# Patient Record
Sex: MALE
[De-identification: names, ages, dates, MRNs, and addresses within clinical notes are randomized per-mention and may not be internally consistent; named-entity substitution may affect disease eponyms.]

---

## 2023-01-13 ENCOUNTER — HOSPITAL ENCOUNTER (OUTPATIENT)
Dept: HOSPITAL 97 - ER | Age: 58
Setting detail: OBSERVATION
LOS: 2 days | Discharge: HOME | End: 2023-01-15
Attending: HOSPITALIST | Admitting: HOSPITALIST
Payer: SELF-PAY

## 2023-01-13 VITALS — BODY MASS INDEX: 21.6 KG/M2

## 2023-01-13 DIAGNOSIS — R41.82: ICD-10-CM

## 2023-01-13 DIAGNOSIS — Z87.891: ICD-10-CM

## 2023-01-13 DIAGNOSIS — E78.5: ICD-10-CM

## 2023-01-13 DIAGNOSIS — E11.649: Primary | ICD-10-CM

## 2023-01-13 DIAGNOSIS — Z20.822: ICD-10-CM

## 2023-01-13 LAB
BUN BLD-MCNC: 12 MG/DL (ref 7–18)
GLUCOSE SERPLBLD-MCNC: 40 MG/DL (ref 74–106)
HCT VFR BLD CALC: 44 % (ref 39.6–49)
INR BLD: 1.03
LYMPHOCYTES # SPEC AUTO: 1.2 K/UL (ref 0.7–4.9)
MCV RBC: 87.3 FL (ref 80–100)
METHAMPHET UR QL SCN: NEGATIVE
PMV BLD: 7.9 FL (ref 7.6–11.3)
POTASSIUM SERPL-SCNC: 3.8 MMOL/L (ref 3.5–5.1)
RBC # BLD: 5.04 M/UL (ref 4.33–5.43)
THC SERPL-MCNC: NEGATIVE NG/ML
TROPONIN I SERPL HS-MCNC: 6.8 PG/ML (ref ?–58.9)

## 2023-01-13 PROCEDURE — 36415 COLL VENOUS BLD VENIPUNCTURE: CPT

## 2023-01-13 PROCEDURE — 87811 SARS-COV-2 COVID19 W/OPTIC: CPT

## 2023-01-13 PROCEDURE — 71045 X-RAY EXAM CHEST 1 VIEW: CPT

## 2023-01-13 PROCEDURE — 96365 THER/PROPH/DIAG IV INF INIT: CPT

## 2023-01-13 PROCEDURE — 70450 CT HEAD/BRAIN W/O DYE: CPT

## 2023-01-13 PROCEDURE — 85730 THROMBOPLASTIN TIME PARTIAL: CPT

## 2023-01-13 PROCEDURE — 85610 PROTHROMBIN TIME: CPT

## 2023-01-13 PROCEDURE — 83036 HEMOGLOBIN GLYCOSYLATED A1C: CPT

## 2023-01-13 PROCEDURE — 84100 ASSAY OF PHOSPHORUS: CPT

## 2023-01-13 PROCEDURE — 80048 BASIC METABOLIC PNL TOTAL CA: CPT

## 2023-01-13 PROCEDURE — 80053 COMPREHEN METABOLIC PANEL: CPT

## 2023-01-13 PROCEDURE — 82947 ASSAY GLUCOSE BLOOD QUANT: CPT

## 2023-01-13 PROCEDURE — 80307 DRUG TEST PRSMV CHEM ANLYZR: CPT

## 2023-01-13 PROCEDURE — 84484 ASSAY OF TROPONIN QUANT: CPT

## 2023-01-13 PROCEDURE — 83735 ASSAY OF MAGNESIUM: CPT

## 2023-01-13 PROCEDURE — 99285 EMERGENCY DEPT VISIT HI MDM: CPT

## 2023-01-13 PROCEDURE — 96366 THER/PROPH/DIAG IV INF ADDON: CPT

## 2023-01-13 PROCEDURE — 80061 LIPID PANEL: CPT

## 2023-01-13 PROCEDURE — 93005 ELECTROCARDIOGRAM TRACING: CPT

## 2023-01-13 PROCEDURE — 85025 COMPLETE CBC W/AUTO DIFF WBC: CPT

## 2023-01-13 PROCEDURE — 81003 URINALYSIS AUTO W/O SCOPE: CPT

## 2023-01-13 NOTE — RAD REPORT
EXAM DESCRIPTION:  Bill Single View1/13/2023 8:10 pm

 

CLINICAL HISTORY:  Slurred speech

 

COMPARISON:  none

 

FINDINGS:   The lungs appear clear of acute infiltrate. The heart is normal size

 

IMPRESSION:   No acute abnormalities displayed

## 2023-01-13 NOTE — P.HP
Certification for Inpatient


Patient admitted to: Observation


With expected LOS: <2 Midnights


Patient will require the following post-hospital care: None


Practitioner: I am a practitioner with admitting privileges, knowledge of 

patient current condition, hospital course, and medical plan of care.


Services: Services provided to patient in accordance with Admission requirements

found in Title 42 Section 412.3 of the Code of Federal Regulations





Patient History


Date of Service: 01/13/23


Primary Care Provider: Adriel hoover


Reason for admission: Hypoglycemia


History of Present Illness: 


Patient is a 57-year-old male with past medical history of noninsulin-dependent 

type 2 diabetes and hyperlipidemia who presented to the emergency department 

with altered mental status.  Code stroke was initially called but he was found 

to have a blood sugar of 20.  Patient's family reports that he used to take 

over-the-counter insulin but was recently switched over to metformin and 

glyburide by the [facility].  However, family is unsure if he has been 

taking his medications or not.  They say that patient has been acting abnormally

for a week or so and that he barely eats. He does not check his BS at home.  

Head CT was negative for CVA. UDS/etoh negative.  His mentation improved with 

D10W.  He is now awake alert answering questions appropriately.  He is primarily

Setswana-speaking but can answer most questions. Blood sugar has come up to 157. 

ED provider wishes to admit patient for observation.





Home medications list reviewed: Yes





- Past Medical/Surgical History


Diabetic: Yes


-: Type 2 Diabetes, Non-Insulin Dependent


-: Hyperlipidemia


Past Surgical History: Patient denies surgical history


Psychosocial/ Personal History: Patient lives at home alone.





- Family History


  ** Brother


-: Diabetes





- Social History


Smoking Status: Former smoker


Alcohol use: No


CD- Drugs: No


Caffeine use: Yes


Place of Residence: Home





Review of Systems


Unremarkable





Physical Examination





- Vital Signs


Temperature: 98.1 F


Blood Pressure: 122/70


Pulse: 84


Respirations: 20


Pulse Ox (%): 100





- Physical Exam


General: Alert, In no apparent distress, Oriented x3


HEENT: Atraumatic, PERRLA, EOMI, Sclerae nonicteric


Neck: Supple, 2+ carotid pulse no bruit


Respiratory: Clear to auscultation bilaterally, Normal air movement


Cardiovascular: Regular rate/rhythm, Normal S1 S2


Gastrointestinal: Normal bowel sounds, No tenderness


Musculoskeletal: No tenderness


Integumentary: No rashes


Neurological: Normal speech, Normal strength at 5/5 x4 extr, Normal tone, Normal

affect





- Studies


Laboratory Data (last 24 hrs)





01/13/23 20:55: PT 11.3, INR 1.03, APTT 30.1


01/13/23 20:12: WBC 15.00 H, Hgb 14.8, Hct 44.0, Plt Count 225


01/13/23 20:12: Sodium 141, Potassium 3.8, BUN 12, Creatinine 1.02, Glucose 40 

L*








Assessment and Plan





- Problems (Diagnosis)


(1) Type 2 diabetes mellitus


Current Visit: Yes   Status: Chronic   


Qualifiers: 


   Diabetes mellitus long term insulin use: without long term use   Diabetes 

mellitus complication status: with hypoglycemia   Diabetes mellitus complication

detail: without coma   Qualified Code(s): E11.649 - Type 2 diabetes mellitus 

with hypoglycemia without coma   





(2) Hyperlipidemia


Current Visit: Yes   Status: Chronic   


Qualifiers: 


   Hyperlipidemia type: unspecified   Qualified Code(s): E78.5 - Hyperlipidemia,

unspecified   





- Plan


Patient is admitted for observation for hypoglycemia.


Hypoglycemia secondary to sulfonylurea and not eating.


Monitor BS every 4 hours.


D5 1/2 at 75 cc/hr.


Check A1c and lipid panel in morning.


Diabetic education consult.


Patient's family requesting prescription for glucometer on dispo. 


Hold diabetic medications for now.


Monitor and replete electrolytes per protocol.


Full code.





Discharge Plan: Home


Plan to discharge in: 24 Hours





- Advance Directives


Does patient have a Living Will: No


Does patient have a Durable POA for Healthcare: No





- Code Status/Comfort Care


Code Status Assessed: Yes


Code Status: Full Code


Physician Review: Patient Assessed, Agree with Above Assessment and Plan


Critical Care: No


Time Spent Managing Pts Care (In Minutes): 50

## 2023-01-13 NOTE — ER
Nurse's Notes                                                                                     

 [facility]                                                                 

Name: Cole Mack                                                                               

Age: 57 yrs                                                                                       

Sex: Male                                                                                         

: 1965                                                                                   

MRN: [id number]                                                                                   

Arrival Date: 2023                                                                          

Time: 19:42                                                                                       

Account#: [id number]                                                                            

Bed 2                                                                                             

Private MD:                                                                                       

Diagnosis: Hypoglycemia, unspecified;Altered mental status, unspecified                           

                                                                                                  

Presentation:                                                                                     

                                                                                             

20:01 Chief complaint: Patient's son or daughter states: pt called her reporting that         kb3 

      something happened to his house and it's all torn up but he has no idea what happened.      

      States she arrived at his house approximately 20 minutes PTA and pt was altered and         

      slurring his speech. Pt follows commands, speech is slurred. Oriented to person. Pt's       

      daughter also reports that pt told her he had a similar episode of confusion ad slurred     

      speech around 12:00 this afternoon. Unknown how long it lasted. Daughter states last        

      seen normal was 1700 and she was with the patient. when asked about conflicting stories     

      and if pt uses drugs or alcohol, daughter became very defensive, stating no history at      

      all. Code joshua called and pt transferred immediately to CT. While in CT, pt is            

      combative and attempted to kick and hit the CT Tech. Code Santoyo called. Ebola Screen:        

      Patient negative for fever greater than or equal to 101.5 degrees Fahrenheit, and           

      additional compatible Ebola Virus Disease symptoms Patient denies exposure to               

      infectious person. Patient denies travel to an Ebola-affected area in the 21 days           

      before illness onset. An acute neurological deficit is present. The charge nurse has        

      been notified. Initial Sepsis Screen: Does the patient meet any 2 criteria?. Risk           

      Assessment: Do you want to hurt yourself or someone else? Patient reports no desire to      

      harm self or others. Onset of symptoms is unknown.                                          

20:01 Method Of Arrival: Wheelchair                                                           kb3 

20:01 Acuity: MARIO 2                                                                           kb3 

20:15 Coronavirus screen: At this time, the client does not indicate any symptoms associated  jb4 

      with coronavirus-19. The patients blood glucose was checked before arriving to the          

      hospital and was found to be hypoglycemic.                                                  

                                                                                                  

Triage Assessment:                                                                                

20:09 The onset of the patients symptoms was at an unknown time. General: Appears in no       kb3 

      apparent distress. slender, Behavior is agitated, combative. Pain: Denies pain. Neuro:      

      Level of Consciousness is alert, confused, Oriented to person,  are equal              

      bilaterally Moves all extremities. Speech is slurred, Facial symmetry appears normal,       

      Reports.                                                                                    

                                                                                                  

Stroke Activation: Symptom onset > 6 hours                                                        

 Physician: Stroke Attending; Name: ; Notified At: ; Arrived At:                                  

 Physician: Chief Stroke Resident; Name: ; Notified At: ; Arrived At:                             

 Physician: Stroke Resident; Name: ; Notified At: ; Arrived At:                                   

 Physician: ED Attending; Name: Cipriano; Notified At:  19:55; Arrived At:               

   19:55                                                                                

 Physician: ED Resident; Name: ; Notified At: ; Arrived At:                                       

                                                                                                  

Historical:                                                                                       

- Allergies:                                                                                      

20:09 No Known Allergies;                                                                     kb3 

- Home Meds:                                                                                      

22:27 Metformin Oral [Active]; Glyburide Oral [Active];                                       kl  

- PMHx:                                                                                           

22:27 NIDDM;                                                                                  kl  

- PSHx:                                                                                           

20:09 None;                                                                                   kb3 

                                                                                                  

- Immunization history:: Adult Immunizations unknown, Last tetanus immunization:                  

  unknown.                                                                                        

- Social history:: Smoking status: Patient denies any tobacco usage or history of.                

  Patient/guardian denies using alcohol, street drugs.                                            

                                                                                                  

                                                                                                  

Screenin:30 [facility] ED Fall Risk Assessment (Adult) History of falling in the last 3 months,       jb4 

      including since admission No falls in past 3 months (0 pts) Confusion or Disorientation     

      No (0 pts) Intoxicated or Sedated No (0 pts) Impaired Gait No (0 pts) Mobility Assist       

      Device Used No (0 pt) Altered Elimination No (0 pt) Score/Fall Risk Level 0 - 2 = Low       

      Risk Oriented to surroundings, Maintained a safe environment. Abuse screen: Denies          

      threats or abuse. Nutritional screening: No deficits noted. Tuberculosis screening: No      

      symptoms or risk factors identified.                                                        

                                                                                                  

Assessment:                                                                                       

20:28 VAN Scoring: Arm Drift: Patients demonstrates NO arm weakness. Patient is VAN Negative. jb4 

      The patient has not been NPO before screening. The patient is alert, and able to follow     

      commands. The patient does not exhibit slurred or garbled speech. The patient is not        

      exhibiting difficulty speaking. The patient does not exhibit difficulty understanding       

      words. The patient is able to swallow own secretions with no drooling or need for           

      suction. Patient tolerated one teaspoon of water. No drooling, immediate coughing,          

      gurgling, or clearing of the throat was noted. The patient tolerated 90mL of water. No      

      drooling, immediate coughing, gurgling, or clearing of the throat was noted. The            

      patient passed the bedside swallow screening. Oral medications may be given as ordered.     

      Contact Physician for further diet orders. Provider notified of bedside swallow             

      screening results: Steve Cota MD.                                                        

20:28 General: Appears in no apparent distress. comfortable, Behavior is calm, cooperative,   jb4 

      appropriate for age. Pain: Denies pain. Neuro: Level of Consciousness is awake, alert,      

      obeys commands, Oriented to person, place, time, situation. Cardiovascular: Patient's       

      skin is warm and dry. Respiratory: Airway is patent Respiratory effort is even,             

      unlabored, Respiratory pattern is regular, symmetrical. GI: No signs and/or symptoms        

      were reported involving the gastrointestinal system. : No signs and/or symptoms were      

      reported regarding the genitourinary system. EENT: No signs and/or symptoms were            

      reported regarding the EENT system. Derm: Skin is intact, Skin is pink, warm \T\ dry.       

      Musculoskeletal: Circulation, motion, and sensation intact. Range of motion: intact in      

      all extremities.                                                                            

21:20 Reassessment: Patient appears in no apparent distress at this time. Patient and/or      jb4 

      family updated on plan of care and expected duration. Pain level reassessed. Patient is     

      alert, oriented x 3, equal unlabored respirations, skin warm/dry/pink.  provider     

      notified.                                                                                   

22:30 Reassessment: Patient is alert, oriented x 3, equal unlabored respirations, skin        bb  

      warm/dry/pink. awaiting room assignment.                                                    

23:37 Reassessment: report called to William COVINGTON for room 412.                                  jb4 

                                                                                             

00:04 Reassessment: Patient appears in no apparent distress at this time. Patient and/or      jb4 

      family updated on plan of care and expected duration. Pain level reassessed. Patient is     

      alert, oriented x 3, equal unlabored respirations, skin warm/dry/pink.                      

                                                                                                  

Vital Signs:                                                                                      

                                                                                             

20:36  / 70; Pulse 80; Resp 18; Temp 98.1(TE); Pulse Ox 97% on R/A;                     jb4 

21:30  / 70; Pulse 84; Resp 20; Pulse Ox 100% on R/A;                                   jb4 

22:30 BP 99 / 75; Pulse 78; Resp 17 S; Temp 98.2; Pulse Ox 100% on R/A;                       bb  

23:15 BP 99 / 70; Pulse 71; Resp 16; Pulse Ox 100% on R/A;                                    jb4 

                                                                                                  

NIH Stroke Scale Scores:                                                                          

20:24 NIHSS Score: 0                                                                          kdr 

20:28 NIHSS Score: 0                                                                          jb4 

                                                                                                  

ED Course:                                                                                        

19:42 Patient arrived in ED.                                                                  jj6 

19:48 Steve Cota MD is Attending Physician.                                              kdr 

20:07 Triage completed.                                                                       kb3 

20:09 Arm band placed on right wrist.                                                         kb3 

20:10 CT Stroke Brain w/o Contrast In Process Unspecified.                                    EDMS

20:13 Stroke CXR 1 View In Process Unspecified.                                               EDMS

20:21 Nirmal Mistry, RN is Primary Nurse.                                                     jb4 

20:40 Initial lab(s) drawn, by ED staff, sent to lab. Inserted saline lock: 20 gauge in right jb4 

      antecubital area, using aseptic technique. Blood collected.                                 

20:40 Inserted saline lock: 22 gauge in left antecubital area, using aseptic technique.       jb4 

22:06 Mo Vergara MD is Hospitalizing Provider.                                           kdr 

                                                                                             

00:06 No provider procedures requiring assistance completed. Patient admitted, IV remains in  jb4 

      place.                                                                                      

                                                                                                  

Administered Medications:                                                                         

                                                                                             

20:10 Drug: D10 in Water [4ml/kg] 250 ml Route: IVP; Site: left antecubital;                  jb4 

23:42 Follow up: Response: Blood sugar is elevated                                            bb  

20:45 Drug: Banana Bag - (NS 0.9% 1000 ml, foLIC Acid 1 mg, Thiamine 100 mg, Multivitamin 1   jb4 

      amp) Route: IV; Rate: calculated rate; Site: left antecubital;                              

23:42 Follow up: IV Status: Completed infusion; IV Intake: 950ml                              bb  

20:45 Drug: Aspirin 325 mg Route: PO;                                                         jb4 

23:42 Follow up: Response: No adverse reaction                                                bb  

                                                                                                  

                                                                                                  

Intake:                                                                                           

23:42 IV: 950ml; Total: 950ml.                                                                bb  

                                                                                                  

Outcome:                                                                                          

22:07 Decision to Hospitalize by Provider.                                                    kdr 

23:41 Admitted to Tele accompanied by nurse, via stretcher, room 412, with chart.             bb  

23:41 Condition: stable                                                                           

23:41 Instructed on the need for admit.                                                           

                                                                                             

00:07 Patient left the ED.                                                                    jb4 

                                                                                                  

                                                                                                  

NIH Stroke Scale - NIH Stroke Score                                                               

Date: 2023                                                                                  

Time: 20:24                                                                                       

Total Score = 0                                                                                   

  1a. Level of Consciousness (LOC) - 0(Alert)                                                     

  1b. Level of Consciousness (LOC) (Month \T\ Age) - 0(Both)                                      

  1c. LOC Commands (Open \T\ Closes Eyes/) - 0(Both)                                          

   2. Best Gaze (Lateral Gaze Paresis) - 0(Normal)                                                

   3. Visual Field Loss - 0(No visual loss)                                                       

   4. Facial Palsy - 0(Normal)                                                                    

  5a. Left Arm: Motor (10-second hold) - 0(No drift)                                              

  5b. Right Arm: Motor (10-second hold) - 0(No drift)                                             

  6a. Left Leg: Motor (5-second hold - always test supine) - 0(No drift)                          

  6b. Right Leg: Motor (5-second hold - always test supine) - 0(No drift)                         

   7. Limb Ataxia (finger/nose \T\ heel/shin - test with eyes open) - 0(Absent)                   

   8. Sensory Loss (pinprick arms/legs/face) - 0(Normal)                                          

   9. Best Language: Aphasia (description/naming/reading) - 0(No aphasia)                         

  10. Dysarthria (speech clarity - read or repeat words) - 0(Normal)                              

  11. Extinction and Inattention (visual/tactile/auditory/spatial/personal) - 0(No                

      abnormality)                                                                                

Initials: [facility]                                                                                     

                                                                                                  

                                                                                                  

NIH Stroke Scale - NIH Stroke Score                                                               

Date: 2023                                                                                  

Time: 20:28                                                                                       

Total Score = 0                                                                                   

  1a. Level of Consciousness (LOC) - 0(Alert)                                                     

  1b. Level of Consciousness (LOC) (Month \T\ Age) - 0(Both)                                      

  1c. LOC Commands (Open \T\ Closes Eyes/) - 0(Both)                                          

   2. Best Gaze (Lateral Gaze Paresis) - 0(Normal)                                                

   3. Visual Field Loss - 0(No visual loss)                                                       

   4. Facial Palsy - 0(Normal)                                                                    

  5a. Left Arm: Motor (10-second hold) - 0(No drift)                                              

  5b. Right Arm: Motor (10-second hold) - 0(No drift)                                             

  6a. Left Leg: Motor (5-second hold - always test supine) - 0(No drift)                          

  6b. Right Leg: Motor (5-second hold - always test supine) - 0(No drift)                         

   7. Limb Ataxia (finger/nose \T\ heel/shin - test with eyes open) - 0(Absent)                   

   8. Sensory Loss (pinprick arms/legs/face) - 0(Normal)                                          

   9. Best Language: Aphasia (description/naming/reading) - 0(No aphasia)                         

  10. Dysarthria (speech clarity - read or repeat words) - 0(Normal)                              

  11. Extinction and Inattention (visual/tactile/auditory/spatial/personal) - 0(No                

      abnormality)                                                                                

Initials: jb4                                                                                     

                                                                                                  

Signatures:                                                                                       

Dispatcher [employer]                           EDDiana Millard, RN                     RN   Steve Gallego MD MD kdr Ballard, Brenda, RN                     RN   Nirmal Davis RN RN   jbBarbie Rincon Kelly RN                    RN   kb3                                                  

                                                                                                  

Corrections: (The following items were deleted from the chart)                                    

                                                                                             

21:06 20:36  / 70; Pulse 80bpm; Resp 18bpm; Pulse Ox 97% RA; stefanie                jbJovanny         

22:27 20:09 Home Meds: None; sandy noriega          

22: 20:09 PMHx: None; sandy noriega          

                                                                                                  

**************************************************************************************************

## 2023-01-13 NOTE — EDPHYS
Physician Documentation                                                                           

 [facility]                                                                 

Name: Cole Mack                                                                               

Age: 57 yrs                                                                                       

Sex: Male                                                                                         

: 1965                                                                                   

MRN: [id number]                                                                                   

Arrival Date: 2023                                                                          

Time: 19:42                                                                                       

Account#: [id number]                                                                            

Bed 2                                                                                             

Private MD:                                                                                       

ED Physician Steve Cota                                                                       

HPI:                                                                                              

                                                                                             

20:24 This 57 yrs old  Male presents to ER via Wheelchair with complaints of Slurred  kdr 

      Speech.                                                                                     

20:24 The patient presents to the emergency department with a speech or higher order brain    kdr 

      function problem, aphasia, that is mild. Onset: The symptoms/episode began/occurred         

      suddenly, just prior to arrival. Context: occurred at home, occurred while the patient      

      was at rest. Associated signs and symptoms: Pertinent positives: confusion. Severity of     

      symptoms: At their worst the symptoms were mild moderate just prior to arrival, in the      

      emergency department the symptoms have resolved By the time he had returned from CT.        

      Patient's baseline: Neuro: alert and fully oriented, Motor: no deficits, Ambulation:        

      walks without assistance, Speech: normal for age, The patient's primary language is         

      German and while he was not able to respond quickly to questions in English, he did        

      respond appropriately to questions rephrased in German. Current symptoms: confusion,       

      mildly confused and was somewhat combative in CT. BG found to be 22. The patient has        

      not experienced similar symptoms in the past. The patient has been recently seen by a       

      physician: the patient's primary care provider, 1 week(s) ago, Daughter related that he     

      is poorly compliant with his medications and does not eat properly..                        

                                                                                                  

Historical:                                                                                       

- Allergies:                                                                                      

20:09 No Known Allergies;                                                                     kb3 

- Home Meds:                                                                                      

22:27 Metformin Oral [Active]; Glyburide Oral [Active];                                       kl  

- PMHx:                                                                                           

22:27 NIDDM;                                                                                  kl  

- PSHx:                                                                                           

20:09 None;                                                                                   kb3 

                                                                                                  

- Immunization history:: Adult Immunizations unknown, Last tetanus immunization:                  

  unknown.                                                                                        

- Social history:: Smoking status: Patient denies any tobacco usage or history of.                

  Patient/guardian denies using alcohol, street drugs.                                            

                                                                                                  

                                                                                                  

ROS:                                                                                              

20:24 Constitutional: Negative for fever, chills, and weight loss, Eyes: Negative for injury, kdr 

      pain, redness, and discharge, ENT: Negative for injury, pain, and discharge, Neck:          

      Negative for injury, pain, and swelling, Cardiovascular: Negative for chest pain,           

      palpitations, and edema, Respiratory: Negative for shortness of breath, cough,              

      wheezing, and pleuritic chest pain, Abdomen/GI: Negative for abdominal pain, nausea,        

      vomiting, diarrhea, and constipation, Back: Negative for injury and pain, : Negative      

      for injury, bleeding, discharge, and swelling, MS/Extremity: Negative for injury and        

      deformity, Skin: Negative for injury, rash, and discoloration, Psych: Negative for          

      depression, anxiety, suicide ideation, homicidal ideation, and hallucinations,              

      Allergy/Immunology: Negative for hives, rash, and allergies, Endocrine: Negative for        

      neck swelling, polydipsia, polyuria, polyphagia, and marked weight changes,                 

      Hematologic/Lymphatic: Negative for swollen nodes, abnormal bleeding, and unusual           

      bruising.                                                                                   

20:24 Neuro: Positive for altered mental status, speech changes.                                  

                                                                                                  

Exam:                                                                                             

20:24 Constitutional:  This is a well developed, well nourished patient who is awake, alert,  kdr 

      and in no acute distress. Head/Face:  Normocephalic, atraumatic. Eyes:  Pupils equal        

      round and reactive to light, extra-ocular motions intact.  Lids and lashes normal.          

      Conjunctiva and sclera are non-icteric and not injected.  Cornea within normal limits.      

      Periorbital areas with no swelling, redness, or edema. Neck:  Trachea midline, no           

      thyromegaly or masses palpated, and no cervical lymphadenopathy.  Supple, full range of     

      motion without nuchal rigidity, or vertebral point tenderness.  No Meningismus.             

      Chest/axilla:  Normal chest wall appearance and motion.  Nontender with no deformity.       

      No lesions are appreciated. Cardiovascular:  Regular rate and rhythm with a normal S1       

      and S2.  No gallops, murmurs, or rubs.  Normal PMI, no JVD.  No pulse deficits.             

      Respiratory:  Lungs have equal breath sounds bilaterally, clear to auscultation and         

      percussion.  No rales, rhonchi or wheezes noted.  No increased work of breathing, no        

      retractions or nasal flaring. Abdomen/GI:  Soft, non-tender, with normal bowel sounds.      

      No distension or tympany.  No guarding or rebound.  No evidence of tenderness               

      throughout. Back:  No spinal tenderness.  No costovertebral tenderness.  Full range of      

      motion. Skin:  Warm, dry with normal turgor.  Normal color with no rashes, no lesions,      

      and no evidence of cellulitis. MS/ Extremity:  Pulses equal, no cyanosis.                   

      Neurovascular intact.  Full, normal range of motion. Neuro:  Awake and alert, GCS 15,       

      oriented to person, place, time, and situation.  Cranial nerves II-XII grossly intact.      

      Motor strength 5/5 in all extremities.  Sensory grossly intact.  Cerebellar exam            

      normal.  Normal gait. Psych:  Awake, alert, with orientation to person, place and time.     

       Behavior, mood, and affect are within normal limits.                                       

                                                                                                  

Vital Signs:                                                                                      

20:36  / 70; Pulse 80; Resp 18; Temp 98.1(TE); Pulse Ox 97% on R/A;                     jb4 

21:30  / 70; Pulse 84; Resp 20; Pulse Ox 100% on R/A;                                   jb4 

22:30 BP 99 / 75; Pulse 78; Resp 17 S; Temp 98.2; Pulse Ox 100% on R/A;                       bb  

23:15 BP 99 / 70; Pulse 71; Resp 16; Pulse Ox 100% on R/A;                                    jb4 

                                                                                                  

NIH Stroke Scale Scores:                                                                          

20:24 NIHSS Score: 0                                                                          kdr 

20:28 NIHSS Score: 0                                                                          jb4 

                                                                                                  

MDM:                                                                                              

20:24 Data reviewed: vital signs, nurses notes, lab test result(s), EKG, radiologic studies.  kdr 

22:07 Patient medically screened.                                                             kdr 

                                                                                                  

                                                                                             

19:49 Order name: Basic Metabolic Panel; Complete Time: 21:15                                 kdr 

                                                                                             

19:49 Order name: CBC with Diff; Complete Time: 20:40                                         kdr 

                                                                                             

19:49 Order name: High Sensitivity Troponin; Complete Time: 21:15                             kdr 

                                                                                             

19:49 Order name: Protime (+inr); Complete Time: 21:15                                        kdr 

                                                                                             

19:49 Order name: Ptt, Activated; Complete Time: 21:15                                        kdr 

                                                                                             

20:16 Order name: Glucose, Ancillary Testing; Complete Time: 20:40                            EDMS

01                                                                                             

19:49 Order name: CT Stroke Brain w/o Contrast; Complete Time: 20:40                          kdr 

                                                                                             

19:49 Order name: Stroke CXR 1 View; Complete Time: 20:40                                     kdr 

                                                                                             

20:41 Order name: UDS; Complete Time: 21:24                                                   kdr 

                                                                                             

20:41 Order name: ETOH Level; Complete Time: 21:15                                            kdr 

                                                                                             

21:04 Order name: Urine Dipstick-Ancillary; Complete Time: 21:15                              EDMS

                                                                                             

21:31 Order name: Glucose, Ancillary Testing; Complete Time: 22:28                            EDMS

                                                                                             

22:10 Order name: SARS RAPID; Complete Time: 22:47                                              

                                                                                             

19:49 Order name: EKG; Complete Time: 19:50                                                   kdr 

                                                                                             

19:49 Order name: Accucheck; Complete Time: 20:21                                             kdr 

                                                                                             

19:49 Order name: Cardiac monitoring; Complete Time: 20:28                                    kdr 

                                                                                             

19:49 Order name: EKG - Nurse/Tech; Complete Time: 20:28                                      kdr 

                                                                                             

19:49 Order name: IV Saline Lock; Complete Time: 20:28                                        kdr 

                                                                                             

19:49 Order name: Labs collected and sent; Complete Time: 20:28                               kdr 

                                                                                             

19:49 Order name: NPO; Complete Time: 20:28                                                   kdr 

                                                                                             

19:49 Order name: O2 Per Protocol; Complete Time: 20:28                                       kdr 

                                                                                             

19:49 Order name: O2 Sat Monitoring; Complete Time: 20:28                                     kdr 

                                                                                             

19:49 Order name: Stroke Swallow Screen; Complete Time: 20:28                                 kdr 

                                                                                                  

Administered Medications:                                                                         

20:10 Drug: D10 in Water [4ml/kg] 250 ml Route: IVP; Site: left antecubital;                  jb4 

23:42 Follow up: Response: Blood sugar is elevated                                            bb  

20:45 Drug: Banana Bag - (NS 0.9% 1000 ml, foLIC Acid 1 mg, Thiamine 100 mg, Multivitamin 1   jb4 

      amp) Route: IV; Rate: calculated rate; Site: left antecubital;                              

23:42 Follow up: IV Status: Completed infusion; IV Intake: 950ml                              bb  

20:45 Drug: Aspirin 325 mg Route: PO;                                                         jb4 

23:42 Follow up: Response: No adverse reaction                                                bb  

                                                                                                  

                                                                                                  

Disposition Summary:                                                                              

23 22:07                                                                                    

Hospitalization Ordered                                                                           

      Hospitalization Status: Observation                                                     kdr 

      Provider: Mo Vergara 

      Location: Telemetry/MedSurg (observation)                                               kdr 

      Condition: Fair                                                                         kdr 

      Problem: new                                                                            kdr 

      Symptoms: have improved                                                                 kdr 

      Bed/Room Type: Standard                                                                 kdr 

      Room Assignment: 412(23 23:18)                                                    kdr 

      Diagnosis                                                                                   

        - Hypoglycemia, unspecified                                                           kdr 

        - Altered mental status, unspecified                                                  kdr 

      Forms:                                                                                      

        - Medication Reconciliation Form                                                      kdr 

        - SBAR form                                                                           kdr 

                                                                                                  

NIH Stroke Scale - NIH Stroke Score                                                               

Date: 2023                                                                                  

Time: 20:24                                                                                       

Total Score = 0                                                                                   

  1a. Level of Consciousness (LOC) - 0(Alert)                                                     

  1b. Level of Consciousness (LOC) (Month \T\ Age) - 0(Both)                                      

  1c. LOC Commands (Open \T\ Closes Eyes/) - 0(Both)                                          

   2. Best Gaze (Lateral Gaze Paresis) - 0(Normal)                                                

   3. Visual Field Loss - 0(No visual loss)                                                       

   4. Facial Palsy - 0(Normal)                                                                    

  5a. Left Arm: Motor (10-second hold) - 0(No drift)                                              

  5b. Right Arm: Motor (10-second hold) - 0(No drift)                                             

  6a. Left Leg: Motor (5-second hold - always test supine) - 0(No drift)                          

  6b. Right Leg: Motor (5-second hold - always test supine) - 0(No drift)                         

   7. Limb Ataxia (finger/nose \T\ heel/shin - test with eyes open) - 0(Absent)                   

   8. Sensory Loss (pinprick arms/legs/face) - 0(Normal)                                          

   9. Best Language: Aphasia (description/naming/reading) - 0(No aphasia)                         

  10. Dysarthria (speech clarity - read or repeat words) - 0(Normal)                              

  11. Extinction and Inattention (visual/tactile/auditory/spatial/personal) - 0(No                

      abnormality)                                                                                

Initials: [facility]                                                                                     

                                                                                                  

                                                                                                  

NIH Stroke Scale - NIH Stroke Score                                                               

Date: 2023                                                                                  

Time: 20:28                                                                                       

Total Score = 0                                                                                   

  1a. Level of Consciousness (LOC) - 0(Alert)                                                     

  1b. Level of Consciousness (LOC) (Month \T\ Age) - 0(Both)                                      

  1c. LOC Commands (Open \T\ Closes Eyes/) - 0(Both)                                          

   2. Best Gaze (Lateral Gaze Paresis) - 0(Normal)                                                

   3. Visual Field Loss - 0(No visual loss)                                                       

   4. Facial Palsy - 0(Normal)                                                                    

  5a. Left Arm: Motor (10-second hold) - 0(No drift)                                              

  5b. Right Arm: Motor (10-second hold) - 0(No drift)                                             

  6a. Left Leg: Motor (5-second hold - always test supine) - 0(No drift)                          

  6b. Right Leg: Motor (5-second hold - always test supine) - 0(No drift)                         

   7. Limb Ataxia (finger/nose \T\ heel/shin - test with eyes open) - 0(Absent)                   

   8. Sensory Loss (pinprick arms/legs/face) - 0(Normal)                                          

   9. Best Language: Aphasia (description/naming/reading) - 0(No aphasia)                         

  10. Dysarthria (speech clarity - read or repeat words) - 0(Normal)                              

  11. Extinction and Inattention (visual/tactile/auditory/spatial/personal) - 0(No                

      abnormality)                                                                                

Initials: jb4                                                                                     

                                                                                                  

Signatures:                                                                                       

Dispatcher [employer]                           EDDiana Millard, RN                     RN   Steve Gallego MD MD   kdr                                                  

Nirmal Mistry RN                       RN   jb4                                                  

Kelsi Purcell RN                    RN   mary3                                                  

Pau Sutherland PA-C PA-C sb4 Ballard, Brenda RN   bb                                                   

                                                                                                  

Corrections: (The following items were deleted from the chart)                                    

22:27 20:09 Home Meds: None; sandy noriega          

22:27 20:09 PMHx: None; sandy noriega          

23:18 22:07 kdr                                                                       kdr         

                                                                                                  

**************************************************************************************************

## 2023-01-13 NOTE — RAD REPORT
EXAM DESCRIPTION:  CT - Ct Stroke Brain Wo Cont - 1/13/2023 8:09 pm

 

CLINICAL HISTORY:  Slurred speech

 

COMPARISON:  none

 

TECHNIQUE:  Computed axial tomography of the head was obtained.

 

All CT scans are performed using dose optimization technique as appropriate and may include automated
 exposure control or mA/KV adjustment according to patient size.

 

FINDINGS:  An intracranial  bleed is not seen .

 

The ventricles are normal in caliber.

 

No extra-axial fluid collection is noted.

 

No significant hypodensity within the brain noted

 

Fluid within the sinuses/ mastoids is not seen.

 

IMPRESSION:  No acute intracranial abnormality is seen. If patient's symptoms persist  MRI of the bra
in would be recommended.

 

Dr Cota of the emergency room was notified at 8:08 p.m. on January 13, 2023

## 2023-01-14 VITALS — OXYGEN SATURATION: 98 %

## 2023-01-14 LAB
BUN BLD-MCNC: 10 MG/DL (ref 7–18)
GLUCOSE SERPLBLD-MCNC: 46 MG/DL (ref 74–106)
HCT VFR BLD CALC: 38.3 % (ref 39.6–49)
HDLC SERPL-MCNC: 44 MG/DL (ref 40–60)
LDLC SERPL CALC-MCNC: 81 MG/DL (ref ?–130)
LYMPHOCYTES # SPEC AUTO: 1.6 K/UL (ref 0.7–4.9)
MAGNESIUM SERPL-MCNC: 1.7 MG/DL (ref 1.6–2.4)
MCV RBC: 86.5 FL (ref 80–100)
PMV BLD: 8.4 FL (ref 7.6–11.3)
POTASSIUM SERPL-SCNC: 3.7 MMOL/L (ref 3.5–5.1)
RBC # BLD: 4.43 M/UL (ref 4.33–5.43)

## 2023-01-14 RX ADMIN — METFORMIN HYDROCHLORIDE SCH MG: 500 TABLET, EXTENDED RELEASE ORAL at 16:13

## 2023-01-14 RX ADMIN — DEXTROSE AND SODIUM CHLORIDE SCH MLS: 5; .45 INJECTION, SOLUTION INTRAVENOUS at 00:40

## 2023-01-14 RX ADMIN — Medication SCH ML: at 21:41

## 2023-01-14 RX ADMIN — Medication SCH ML: at 08:50

## 2023-01-14 RX ADMIN — HUMAN INSULIN SCH UNIT: 100 INJECTION, SOLUTION SUBCUTANEOUS at 21:41

## 2023-01-14 RX ADMIN — HUMAN INSULIN SCH: 100 INJECTION, SOLUTION SUBCUTANEOUS at 15:56

## 2023-01-14 RX ADMIN — HUMAN INSULIN SCH: 100 INJECTION, SOLUTION SUBCUTANEOUS at 11:30

## 2023-01-14 RX ADMIN — DEXTROSE AND SODIUM CHLORIDE SCH MLS: 5; .45 INJECTION, SOLUTION INTRAVENOUS at 16:16

## 2023-01-14 RX ADMIN — HUMAN INSULIN SCH: 100 INJECTION, SOLUTION SUBCUTANEOUS at 07:30

## 2023-01-14 NOTE — P.PN
Subjective


Date of Service: 01/14/23





Physical Examination





- Vital Signs


Temperature: 97.5 F


Blood Pressure: 120/75


Pulse: 78


Respirations: 18


Pulse Ox (%): 98





Assessment & Plan





- Advance Directives


Does patient have a Living Will: No


Does patient have a Durable POA for Healthcare: No





- Code Status/Comfort Care


Code Status: Full Code


Physician Review: Patient Assessed, Agree with Above Assessment and Plan

## 2023-01-15 VITALS — SYSTOLIC BLOOD PRESSURE: 111 MMHG | TEMPERATURE: 98.6 F | DIASTOLIC BLOOD PRESSURE: 76 MMHG

## 2023-01-15 LAB
ALBUMIN SERPL BCP-MCNC: 3.5 G/DL (ref 3.4–5)
ALP SERPL-CCNC: 55 U/L (ref 45–117)
ALT SERPL W P-5'-P-CCNC: 24 U/L (ref 16–61)
AST SERPL W P-5'-P-CCNC: 21 U/L (ref 15–37)
BUN BLD-MCNC: 15 MG/DL (ref 7–18)
GLUCOSE SERPLBLD-MCNC: 131 MG/DL (ref 74–106)
HCT VFR BLD CALC: 41.3 % (ref 39.6–49)
LYMPHOCYTES # SPEC AUTO: 2.2 K/UL (ref 0.7–4.9)
MCV RBC: 87 FL (ref 80–100)
PMV BLD: 7.8 FL (ref 7.6–11.3)
POTASSIUM SERPL-SCNC: 3.9 MMOL/L (ref 3.5–5.1)
RBC # BLD: 4.74 M/UL (ref 4.33–5.43)

## 2023-01-15 RX ADMIN — METFORMIN HYDROCHLORIDE SCH MG: 500 TABLET, EXTENDED RELEASE ORAL at 07:58

## 2023-01-15 RX ADMIN — HUMAN INSULIN SCH: 100 INJECTION, SOLUTION SUBCUTANEOUS at 07:30

## 2023-01-15 RX ADMIN — Medication SCH ML: at 07:58

## 2023-01-15 NOTE — EKG
Test Date:    2023-01-13               Test Time:    20:18:20

Technician:   MARYAN                                     

                                                     

MEASUREMENT RESULTS:                                       

Intervals:                                           

Rate:         81                                     

MA:           172                                    

QRSD:         96                                     

QT:           392                                    

QTc:          455                                    

Axis:                                                

P:            78                                     

MA:           172                                    

QRS:          46                                     

T:            55                                     

                                                     

INTERPRETIVE STATEMENTS:                                       

                                                     

Normal sinus rhythm

Incomplete right bundle branch block

Borderline ECG

No previous ECG available for comparison



Electronically Signed On 01-15-23 14:23:06 CST by Jose Hernandez

## 2023-01-15 NOTE — RAD REPORT
EXAM DESCRIPTION:  CT - Head Brain Wo Cont - 1/15/2023 9:23 am

 

CLINICAL HISTORY:  CVA

 

COMPARISON:  January 13, 2023

 

TECHNIQUE:  Computed axial tomography of the head was obtained. IV contrast was not requested.

 

All CT scans are performed using dose optimization technique as appropriate and may include automated
 exposure control or mA/KV adjustment according to patient size.

 

FINDINGS:  An intracranial  bleed is not seen .

 

The ventricles are normal in caliber.

 

No significant hypodense areas within the brain visualized

 

No extra-axial fluid collection is noted.

 

Fluid within the sinuses/ mastoids is not seen.

 

IMPRESSION:  No acute intracranial abnormality is seen. If patient's symptoms persist  MRI of the bra
in would be recommended.